# Patient Record
Sex: MALE | Race: WHITE | NOT HISPANIC OR LATINO
[De-identification: names, ages, dates, MRNs, and addresses within clinical notes are randomized per-mention and may not be internally consistent; named-entity substitution may affect disease eponyms.]

---

## 2017-03-13 ENCOUNTER — RX RENEWAL (OUTPATIENT)
Age: 57
End: 2017-03-13

## 2017-05-17 ENCOUNTER — APPOINTMENT (OUTPATIENT)
Dept: INTERNAL MEDICINE | Facility: CLINIC | Age: 57
End: 2017-05-17

## 2017-06-28 ENCOUNTER — RX RENEWAL (OUTPATIENT)
Age: 57
End: 2017-06-28

## 2017-07-06 ENCOUNTER — APPOINTMENT (OUTPATIENT)
Dept: INTERNAL MEDICINE | Facility: CLINIC | Age: 57
End: 2017-07-06

## 2017-07-06 VITALS
RESPIRATION RATE: 18 BRPM | SYSTOLIC BLOOD PRESSURE: 117 MMHG | HEIGHT: 72 IN | DIASTOLIC BLOOD PRESSURE: 76 MMHG | BODY MASS INDEX: 29.8 KG/M2 | WEIGHT: 220 LBS | TEMPERATURE: 98.5 F | HEART RATE: 62 BPM | OXYGEN SATURATION: 97 %

## 2017-07-06 DIAGNOSIS — Z87.898 PERSONAL HISTORY OF OTHER SPECIFIED CONDITIONS: ICD-10-CM

## 2017-07-07 LAB
25(OH)D3 SERPL-MCNC: 35.1 NG/ML
ALBUMIN SERPL ELPH-MCNC: 4.6 G/DL
ALP BLD-CCNC: 55 U/L
ALT SERPL-CCNC: 27 U/L
ANION GAP SERPL CALC-SCNC: 15 MMOL/L
APPEARANCE: CLEAR
AST SERPL-CCNC: 23 U/L
BASOPHILS # BLD AUTO: 0.03 K/UL
BASOPHILS NFR BLD AUTO: 0.6 %
BILIRUB SERPL-MCNC: 0.4 MG/DL
BILIRUBIN URINE: NEGATIVE
BLOOD URINE: NEGATIVE
BUN SERPL-MCNC: 14 MG/DL
CALCIUM SERPL-MCNC: 9.2 MG/DL
CHLORIDE SERPL-SCNC: 104 MMOL/L
CHOLEST SERPL-MCNC: 183 MG/DL
CHOLEST/HDLC SERPL: 2.5 RATIO
CO2 SERPL-SCNC: 26 MMOL/L
COLOR: YELLOW
CREAT SERPL-MCNC: 1.02 MG/DL
EOSINOPHIL # BLD AUTO: 0.11 K/UL
EOSINOPHIL NFR BLD AUTO: 2 %
GLUCOSE QUALITATIVE U: NORMAL MG/DL
GLUCOSE SERPL-MCNC: 104 MG/DL
HBA1C MFR BLD HPLC: 5.4 %
HCT VFR BLD CALC: 45.7 %
HDLC SERPL-MCNC: 72 MG/DL
HGB BLD-MCNC: 15.4 G/DL
IMM GRANULOCYTES NFR BLD AUTO: 0.2 %
KETONES URINE: NEGATIVE
LDLC SERPL CALC-MCNC: 97 MG/DL
LEUKOCYTE ESTERASE URINE: NEGATIVE
LYMPHOCYTES # BLD AUTO: 1.51 K/UL
LYMPHOCYTES NFR BLD AUTO: 27.8 %
MAN DIFF?: NORMAL
MCHC RBC-ENTMCNC: 30.4 PG
MCHC RBC-ENTMCNC: 33.7 GM/DL
MCV RBC AUTO: 90.3 FL
MONOCYTES # BLD AUTO: 0.55 K/UL
MONOCYTES NFR BLD AUTO: 10.1 %
NEUTROPHILS # BLD AUTO: 3.22 K/UL
NEUTROPHILS NFR BLD AUTO: 59.3 %
NITRITE URINE: NEGATIVE
PH URINE: 6.5
PLATELET # BLD AUTO: 212 K/UL
POTASSIUM SERPL-SCNC: 4.6 MMOL/L
PROT SERPL-MCNC: 7.4 G/DL
PROTEIN URINE: NEGATIVE MG/DL
PSA FREE FLD-MCNC: 22.5
PSA FREE SERPL-MCNC: 0.34 NG/ML
PSA SERPL-MCNC: 1.51 NG/ML
RBC # BLD: 5.06 M/UL
RBC # FLD: 13 %
SODIUM SERPL-SCNC: 145 MMOL/L
SPECIFIC GRAVITY URINE: 1.02
TRIGL SERPL-MCNC: 70 MG/DL
TSH SERPL-ACNC: 1.07 UIU/ML
UROBILINOGEN URINE: NORMAL MG/DL
WBC # FLD AUTO: 5.43 K/UL

## 2017-07-31 ENCOUNTER — RX RENEWAL (OUTPATIENT)
Age: 57
End: 2017-07-31

## 2017-08-31 ENCOUNTER — RX RENEWAL (OUTPATIENT)
Age: 57
End: 2017-08-31

## 2017-10-02 ENCOUNTER — RX RENEWAL (OUTPATIENT)
Age: 57
End: 2017-10-02

## 2017-10-02 DIAGNOSIS — R19.5 OTHER FECAL ABNORMALITIES: ICD-10-CM

## 2017-10-16 PROBLEM — R19.5 GUAIAC + STOOL: Status: ACTIVE | Noted: 2017-10-16

## 2017-10-30 ENCOUNTER — RX RENEWAL (OUTPATIENT)
Age: 57
End: 2017-10-30

## 2017-12-11 ENCOUNTER — RX RENEWAL (OUTPATIENT)
Age: 57
End: 2017-12-11

## 2018-01-08 ENCOUNTER — RX RENEWAL (OUTPATIENT)
Age: 58
End: 2018-01-08

## 2018-02-12 ENCOUNTER — RX RENEWAL (OUTPATIENT)
Age: 58
End: 2018-02-12

## 2018-03-08 ENCOUNTER — RX RENEWAL (OUTPATIENT)
Age: 58
End: 2018-03-08

## 2018-03-10 ENCOUNTER — EMERGENCY (EMERGENCY)
Facility: HOSPITAL | Age: 58
LOS: 1 days | Discharge: HOME | End: 2018-03-10
Attending: EMERGENCY MEDICINE
Payer: COMMERCIAL

## 2018-03-10 VITALS
OXYGEN SATURATION: 97 % | RESPIRATION RATE: 20 BRPM | HEIGHT: 68 IN | WEIGHT: 229.94 LBS | TEMPERATURE: 98 F | DIASTOLIC BLOOD PRESSURE: 80 MMHG | HEART RATE: 70 BPM | SYSTOLIC BLOOD PRESSURE: 137 MMHG

## 2018-03-10 PROCEDURE — 99282 EMERGENCY DEPT VISIT SF MDM: CPT | Mod: 25

## 2018-03-10 PROCEDURE — 12001 RPR S/N/AX/GEN/TRNK 2.5CM/<: CPT

## 2018-03-10 RX ORDER — ACETAMINOPHEN 500 MG
975 TABLET ORAL ONCE
Qty: 0 | Refills: 0 | Status: COMPLETED | OUTPATIENT
Start: 2018-03-10 | End: 2018-03-10

## 2018-03-10 RX ORDER — TETANUS TOXOID, REDUCED DIPHTHERIA TOXOID AND ACELLULAR PERTUSSIS VACCINE, ADSORBED 5; 2.5; 8; 8; 2.5 [IU]/.5ML; [IU]/.5ML; UG/.5ML; UG/.5ML; UG/.5ML
0.5 SUSPENSION INTRAMUSCULAR ONCE
Qty: 0 | Refills: 0 | Status: COMPLETED | OUTPATIENT
Start: 2018-03-10 | End: 2018-03-10

## 2018-03-10 NOTE — ED PROVIDER NOTE - OBJECTIVE STATEMENT
58 y/o M presents to ED c/o laceration 56 y/o M presents to ED c/o laceration to L thumb s/p trying to separate frozen food with a knife onset today. Currently has pain in the area. Pt denies active bleeding at site, numbness or loss of sensation to the thumb. Unsure of last tetanus shot. Did not self-medicate PTA. NKDA. No further complaints at this time.

## 2018-03-10 NOTE — ED PROVIDER NOTE - PHYSICAL EXAMINATION
VITALS: reviewed  GEN: NAD, A & O x 4  HEAD/EYES: NCAT, PERRL, EOMI, anicteric sclerae, no conjunctival pallor  ENT: mucus membranes moist, oropharynx WNL, trachea midline, no JVD  RESP: lungs CTA with equal breath sounds bilaterally, chest wall nontender and atraumatic  CV: heart with reg rhythm S1, S2, no murmur; distal pulses intact and symmetric bilaterally  ABDOMEN: normoactive bowel sounds, soft, nondistended, nontender, no palpable masses  : no CVAT  MSK: extremities atraumatic and nontender, no edema, no assymetry. the back is without midline or lateral tenderness, there is no spinal deformity or stepoff and the back is ranged painlessly. the neck has no midline tenderness, deformity, or stepoff, and is ranged painlessly.  SKIN: warm, dry, no rash, no bruising, no cyanosis. color appropriate for ethnicity  NEURO: alert, mentating appropriately, no facial asymmetry. gross sensation, motor, coordination are intact  PSYCH: Affect appropriate VITALS: reviewed  GEN: NAD, A & O x 4  HEAD/EYES: NCAT, PERRL, EOMI, anicteric sclerae, no conjunctival pallor  ENT: mucus membranes moist, oropharynx WNL, trachea midline, no JVD  RESP: lungs CTA with equal breath sounds bilaterally, chest wall nontender and atraumatic  CV: heart with reg rhythm S1, S2, no murmur; distal pulses intact and symmetric bilaterally  ABDOMEN: normoactive bowel sounds, soft, nondistended, nontender, no palpable masses  : no CVAT  MSK: extremities atraumatic and nontender, no edema, no assymetry. the back is without midline or lateral tenderness, there is no spinal deformity or stepoff and the back is ranged painlessly. the neck has no midline tenderness, deformity, or stepoff, and is ranged painlessly.  SKIN: warm, dry, no rash, no bruising, no cyanosis. color appropriate for ethnicity,   NEURO: alert, mentating appropriately, no facial asymmetry. gross sensation, motor, coordination are intact  PSYCH: Affect appropriate VITALS: reviewed  GEN: NAD, A & O x 4  HEAD/EYES: NCAT, PERRL, EOMI, anicteric sclerae, no conjunctival pallor  ENT: mucus membranes moist, oropharynx WNL, trachea midline, no JVD  RESP: lungs CTA with equal breath sounds bilaterally, chest wall nontender and atraumatic  CV: heart with reg rhythm S1, S2, no murmur; distal pulses intact and symmetric bilaterally  ABDOMEN: normoactive bowel sounds, soft, nondistended, nontender, no palpable masses  : no CVAT  MSK: extremities atraumatic and nontender, no edema, no assymetry. the back is without midline or lateral tenderness, there is no spinal deformity or stepoff and the back is ranged painlessly. the neck has no midline tenderness, deformity, or stepoff, and is ranged painlessly. full sensation and range of motion in finger with laceration. no bony tenderness or swelling of digits.   SKIN: warm, dry, no rash, no bruising, no cyanosis. color appropriate for ethnicity, 1 cm linear laceration across dorsal surface of distal phalanx of L thumb, does not involve nailbed or cuticle. Normal capillary refill. No other skin lesions.   NEURO: alert, mentating appropriately, no facial asymmetry. gross sensation, motor, coordination are intact  PSYCH: Affect appropriate

## 2018-03-10 NOTE — ED PROVIDER NOTE - MEDICAL DECISION MAKING DETAILS
Patient with small wound with low tension will repair with Dermabond after irrigation. Will also give Tetanus shot.

## 2018-03-10 NOTE — ED ADULT NURSE NOTE - OBJECTIVE STATEMENT
pt A&Ox4, c/o left thumb lac, pt stated he cut himself with a knife while cooking, lac about 1cm to left thumb bleeding controlled

## 2018-03-10 NOTE — ED PROVIDER NOTE - NS ED ROS FT
CONST: no fevers, no chills, no trauma  EYES: no pain, no visual disturbances  ENT: no sore throat, no epistaxis, no rhinorrhea, no hearing changes  CV: no chest pain, no palpitations, no orthopnea, no extremity pain or swelling  RESP: no shortness of breath, no cough, no sputum, no pleurisy, no wheezing  ABD: no abdominal pain, no nausea, no vomiting, no diarrhea, no black or bloody stool  : no dysuria, no hematuria, no frequency, no urgency  MSK: no back pain, no neck pain, no extremity pain  NEURO: no headache, no sensory disturbances, no focal weakness, no dizziness  HEME: no easy bleeding or bruising  SKIN: no diaphoresis, no rash CONST: no fevers, no chills, no trauma  EYES: no pain, no visual disturbances  ENT: no sore throat, no epistaxis, no rhinorrhea, no hearing changes  CV: no chest pain, no palpitations, no orthopnea, no extremity pain or swelling  RESP: no shortness of breath, no cough, no sputum, no pleurisy, no wheezing  ABD: no abdominal pain, no nausea, no vomiting, no diarrhea, no black or bloody stool  : no dysuria, no hematuria, no frequency, no urgency  MSK: no back pain, no neck pain, no extremity pain  NEURO: no headache, no sensory disturbances, no focal weakness, no dizziness  HEME: no easy bleeding or bruising  SKIN: no diaphoresis, no rash, laceration to L 1st digit, pain at site of laceration, no active bleeding MSK: no back pain, no neck pain, see HPI  NEURO: no sensory disturbances, no focal weakness, no dizziness  HEME: no easy bleeding or bruising  SKIN: laceration to L 1st digit, pain at site of laceration, no active bleeding

## 2018-03-10 NOTE — ED PROVIDER NOTE - CARE PLAN
Principal Discharge DX:	Thumb laceration, left, initial encounter  Assessment and plan of treatment:	You were seen and evaluated in the emergency department for a thumb laceration. Your wound was cleaned and closed with tissue adhesive. It will fall off on its own. You may shower normally after 24 hours. Avoid any vigorous movement with your thumb for the next 3-5 days. You may take up to 400mg of ibuprofen (Motrin, Advil) every 6 hours as needed for pain. Please take ibuprofen with food if possible to prevent stomach upset. You may also take up to 1000mg of acetaminophen (Tylenol) every 6 hours as needed for pain. It is ok to mix these medications with each other. Do not mix them with other pain medications such as naproxen (Alieve) or asprin unless specifically instructed by your doctor. Many prescription pain medications and cough medications contain acetaminophen. If you take these medications, please discuss with your provider or primary care doctor if you need to adjust the dose of acetaminophen you can take. Please follow up with your regular doctor for a wound check in 10-14 days. Please return for concerning new or worsening symptoms including redness, swelling, significant new pain numbness, inability to move your finger, discharge of pus, or other worsening or concerning new symptoms.

## 2018-03-11 PROCEDURE — 90471 IMMUNIZATION ADMIN: CPT

## 2018-03-11 PROCEDURE — 90715 TDAP VACCINE 7 YRS/> IM: CPT

## 2018-03-11 PROCEDURE — 99283 EMERGENCY DEPT VISIT LOW MDM: CPT | Mod: 25

## 2018-03-11 PROCEDURE — 12001 RPR S/N/AX/GEN/TRNK 2.5CM/<: CPT | Mod: FA

## 2018-03-11 RX ADMIN — Medication 975 MILLIGRAM(S): at 00:33

## 2018-03-11 RX ADMIN — TETANUS TOXOID, REDUCED DIPHTHERIA TOXOID AND ACELLULAR PERTUSSIS VACCINE, ADSORBED 0.5 MILLILITER(S): 5; 2.5; 8; 8; 2.5 SUSPENSION INTRAMUSCULAR at 00:33

## 2018-03-11 NOTE — ED PROCEDURE NOTE - PROCEDURE ADDITIONAL DETAILS
Good approximation of wound, pre-fabricated splint was placed on thumb to stabilize wound and repair.

## 2018-04-03 ENCOUNTER — RX RENEWAL (OUTPATIENT)
Age: 58
End: 2018-04-03

## 2018-05-15 ENCOUNTER — APPOINTMENT (OUTPATIENT)
Dept: INTERNAL MEDICINE | Facility: CLINIC | Age: 58
End: 2018-05-15

## 2018-05-17 ENCOUNTER — RX RENEWAL (OUTPATIENT)
Age: 58
End: 2018-05-17

## 2018-05-29 ENCOUNTER — RX RENEWAL (OUTPATIENT)
Age: 58
End: 2018-05-29

## 2018-06-28 ENCOUNTER — RX RENEWAL (OUTPATIENT)
Age: 58
End: 2018-06-28

## 2018-07-09 ENCOUNTER — RX RENEWAL (OUTPATIENT)
Age: 58
End: 2018-07-09

## 2018-07-28 ENCOUNTER — RX RENEWAL (OUTPATIENT)
Age: 58
End: 2018-07-28

## 2018-09-12 ENCOUNTER — RX RENEWAL (OUTPATIENT)
Age: 58
End: 2018-09-12

## 2018-10-03 ENCOUNTER — RX RENEWAL (OUTPATIENT)
Age: 58
End: 2018-10-03

## 2018-10-17 ENCOUNTER — RX RENEWAL (OUTPATIENT)
Age: 58
End: 2018-10-17

## 2018-11-15 ENCOUNTER — RX RENEWAL (OUTPATIENT)
Age: 58
End: 2018-11-15

## 2018-11-27 ENCOUNTER — RX RENEWAL (OUTPATIENT)
Age: 58
End: 2018-11-27

## 2018-12-12 PROBLEM — E78.00 PURE HYPERCHOLESTEROLEMIA, UNSPECIFIED: Chronic | Status: ACTIVE | Noted: 2018-03-10

## 2018-12-12 PROBLEM — I10 ESSENTIAL (PRIMARY) HYPERTENSION: Chronic | Status: ACTIVE | Noted: 2018-03-10

## 2018-12-20 ENCOUNTER — OUTPATIENT (OUTPATIENT)
Dept: OUTPATIENT SERVICES | Facility: HOSPITAL | Age: 58
LOS: 1 days | Discharge: HOME | End: 2018-12-20
Payer: COMMERCIAL

## 2018-12-20 VITALS
HEART RATE: 67 BPM | TEMPERATURE: 98 F | OXYGEN SATURATION: 100 % | DIASTOLIC BLOOD PRESSURE: 70 MMHG | HEIGHT: 72 IN | RESPIRATION RATE: 20 BRPM | SYSTOLIC BLOOD PRESSURE: 131 MMHG | WEIGHT: 246.04 LBS

## 2018-12-20 DIAGNOSIS — Z98.890 OTHER SPECIFIED POSTPROCEDURAL STATES: Chronic | ICD-10-CM

## 2018-12-20 DIAGNOSIS — Z98.52 VASECTOMY STATUS: Chronic | ICD-10-CM

## 2018-12-20 DIAGNOSIS — Z96.7 PRESENCE OF OTHER BONE AND TENDON IMPLANTS: Chronic | ICD-10-CM

## 2018-12-20 DIAGNOSIS — S83.241D OTHER TEAR OF MEDIAL MENISCUS, CURRENT INJURY, RIGHT KNEE, SUBSEQUENT ENCOUNTER: ICD-10-CM

## 2018-12-20 LAB
ALBUMIN SERPL ELPH-MCNC: 4.1 G/DL — SIGNIFICANT CHANGE UP (ref 3.3–5)
ALP SERPL-CCNC: 59 U/L — SIGNIFICANT CHANGE UP (ref 40–120)
ALT FLD-CCNC: 44 U/L — SIGNIFICANT CHANGE UP (ref 12–78)
ANION GAP SERPL CALC-SCNC: 6 MMOL/L — SIGNIFICANT CHANGE UP (ref 5–17)
APTT BLD: 33.1 SEC — SIGNIFICANT CHANGE UP (ref 27.5–36.3)
AST SERPL-CCNC: 24 U/L — SIGNIFICANT CHANGE UP (ref 15–37)
BASOPHILS # BLD AUTO: 0.06 K/UL — SIGNIFICANT CHANGE UP (ref 0–0.2)
BASOPHILS NFR BLD AUTO: 0.8 % — SIGNIFICANT CHANGE UP (ref 0–2)
BILIRUB DIRECT SERPL-MCNC: 0.2 MG/DL — SIGNIFICANT CHANGE UP (ref 0–0.2)
BILIRUB INDIRECT FLD-MCNC: 0.3 MG/DL — SIGNIFICANT CHANGE UP (ref 0.2–1)
BILIRUB SERPL-MCNC: 0.5 MG/DL — SIGNIFICANT CHANGE UP (ref 0.2–1.2)
BUN SERPL-MCNC: 16 MG/DL — SIGNIFICANT CHANGE UP (ref 7–23)
CALCIUM SERPL-MCNC: 9.4 MG/DL — SIGNIFICANT CHANGE UP (ref 8.5–10.1)
CHLORIDE SERPL-SCNC: 103 MMOL/L — SIGNIFICANT CHANGE UP (ref 96–108)
CO2 SERPL-SCNC: 27 MMOL/L — SIGNIFICANT CHANGE UP (ref 22–31)
CREAT SERPL-MCNC: 0.95 MG/DL — SIGNIFICANT CHANGE UP (ref 0.5–1.3)
EOSINOPHIL # BLD AUTO: 0.11 K/UL — SIGNIFICANT CHANGE UP (ref 0–0.5)
EOSINOPHIL NFR BLD AUTO: 1.4 % — SIGNIFICANT CHANGE UP (ref 0–6)
GLUCOSE SERPL-MCNC: 98 MG/DL — SIGNIFICANT CHANGE UP (ref 70–99)
HCT VFR BLD CALC: 47.2 % — SIGNIFICANT CHANGE UP (ref 39–50)
HGB BLD-MCNC: 16.1 G/DL — SIGNIFICANT CHANGE UP (ref 13–17)
IMM GRANULOCYTES NFR BLD AUTO: 0.3 % — SIGNIFICANT CHANGE UP (ref 0–1.5)
INR BLD: 1.01 RATIO — SIGNIFICANT CHANGE UP (ref 0.88–1.16)
LYMPHOCYTES # BLD AUTO: 1.83 K/UL — SIGNIFICANT CHANGE UP (ref 1–3.3)
LYMPHOCYTES # BLD AUTO: 24 % — SIGNIFICANT CHANGE UP (ref 13–44)
MCHC RBC-ENTMCNC: 30.4 PG — SIGNIFICANT CHANGE UP (ref 27–34)
MCHC RBC-ENTMCNC: 34.1 GM/DL — SIGNIFICANT CHANGE UP (ref 32–36)
MCV RBC AUTO: 89.1 FL — SIGNIFICANT CHANGE UP (ref 80–100)
MONOCYTES # BLD AUTO: 0.7 K/UL — SIGNIFICANT CHANGE UP (ref 0–0.9)
MONOCYTES NFR BLD AUTO: 9.2 % — SIGNIFICANT CHANGE UP (ref 2–14)
NEUTROPHILS # BLD AUTO: 4.89 K/UL — SIGNIFICANT CHANGE UP (ref 1.8–7.4)
NEUTROPHILS NFR BLD AUTO: 64.3 % — SIGNIFICANT CHANGE UP (ref 43–77)
NRBC # BLD: 0 /100 WBCS — SIGNIFICANT CHANGE UP (ref 0–0)
PLATELET # BLD AUTO: 269 K/UL — SIGNIFICANT CHANGE UP (ref 150–400)
POTASSIUM SERPL-MCNC: 4.3 MMOL/L — SIGNIFICANT CHANGE UP (ref 3.5–5.3)
POTASSIUM SERPL-SCNC: 4.3 MMOL/L — SIGNIFICANT CHANGE UP (ref 3.5–5.3)
PROT SERPL-MCNC: 8.4 GM/DL — HIGH (ref 6–8.3)
PROTHROM AB SERPL-ACNC: 11.2 SEC — SIGNIFICANT CHANGE UP (ref 10–12.9)
RBC # BLD: 5.3 M/UL — SIGNIFICANT CHANGE UP (ref 4.2–5.8)
RBC # FLD: 12.7 % — SIGNIFICANT CHANGE UP (ref 10.3–14.5)
SODIUM SERPL-SCNC: 136 MMOL/L — SIGNIFICANT CHANGE UP (ref 135–145)
WBC # BLD: 7.61 K/UL — SIGNIFICANT CHANGE UP (ref 3.8–10.5)
WBC # FLD AUTO: 7.61 K/UL — SIGNIFICANT CHANGE UP (ref 3.8–10.5)

## 2018-12-20 PROCEDURE — 93010 ELECTROCARDIOGRAM REPORT: CPT

## 2018-12-20 PROCEDURE — 71046 X-RAY EXAM CHEST 2 VIEWS: CPT | Mod: 26

## 2018-12-20 NOTE — H&P PST ADULT - PSH
H/O arthroscopy of left knee  2003  H/O arthroscopy of right knee  2006  H/O: vasectomy  1996  S/P colonoscopy  5 yrs ago  S/P ORIF (open reduction internal fixation) fracture  right ankle 1982

## 2018-12-20 NOTE — H&P PST ADULT - ASSESSMENT
57 y/o male with right knee meniscus tear. Scheduled for right knee arthroscopy.    Plan  1. Stop all NSAIDS, herbal supplements and vitamins for 7 days.  2. NPO at midnight.  3. Take the following medications ( citalopram ) with small sips of water on the morning of your procedure/surgery. Use inhalers morning of surgery.  4. Use EZ sponges as directed  5. Labs, EKG, CXR as per surgeon  6. PMD visit for optimization prior to surgery as per surgeon

## 2018-12-20 NOTE — H&P PST ADULT - FAMILY HISTORY
Father  Still living? Unknown  Family history of Alzheimer's disease, Age at diagnosis: Age Unknown     Mother  Still living? Unknown  Family history of colon cancer in mother, Age at diagnosis: Age Unknown     Sibling  Still living? Unknown  Family history of cancer of tongue, Age at diagnosis: Age Unknown

## 2018-12-20 NOTE — H&P PST ADULT - PMH
Alcoholism  last drink 2014  Anxiety    Asthma    Depression    High cholesterol    History of drug use  coccaine  last use 5-6 yrs ago  HTN (hypertension)    OA (osteoarthritis) of knee    Obesity    Psoriasis    Torn meniscus

## 2018-12-20 NOTE — H&P PST ADULT - HISTORY OF PRESENT ILLNESS
59 y/o male with right knee meniscus tear. Complain of right knee pain. Takes tramadol or Advil with mild pain relief. Scheduled for right knee arthroscopy.

## 2018-12-27 ENCOUNTER — TRANSCRIPTION ENCOUNTER (OUTPATIENT)
Age: 58
End: 2018-12-27

## 2018-12-27 ENCOUNTER — APPOINTMENT (OUTPATIENT)
Dept: INTERNAL MEDICINE | Facility: CLINIC | Age: 58
End: 2018-12-27
Payer: COMMERCIAL

## 2018-12-27 VITALS
WEIGHT: 245 LBS | TEMPERATURE: 97.8 F | OXYGEN SATURATION: 97 % | SYSTOLIC BLOOD PRESSURE: 122 MMHG | RESPIRATION RATE: 17 BRPM | DIASTOLIC BLOOD PRESSURE: 80 MMHG | HEIGHT: 72 IN | HEART RATE: 74 BPM | BODY MASS INDEX: 33.18 KG/M2

## 2018-12-27 DIAGNOSIS — N52.9 MALE ERECTILE DYSFUNCTION, UNSPECIFIED: ICD-10-CM

## 2018-12-27 DIAGNOSIS — Z01.818 ENCOUNTER FOR OTHER PREPROCEDURAL EXAMINATION: ICD-10-CM

## 2018-12-27 PROCEDURE — 99215 OFFICE O/P EST HI 40 MIN: CPT

## 2018-12-27 RX ORDER — TADALAFIL 10 MG/1
10 TABLET, FILM COATED ORAL
Qty: 7 | Refills: 0 | Status: ACTIVE | COMMUNITY
Start: 2018-12-27 | End: 1900-01-01

## 2018-12-31 ENCOUNTER — RESULT REVIEW (OUTPATIENT)
Age: 58
End: 2018-12-31

## 2018-12-31 ENCOUNTER — OUTPATIENT (OUTPATIENT)
Dept: OUTPATIENT SERVICES | Facility: HOSPITAL | Age: 58
LOS: 1 days | Discharge: HOME | End: 2018-12-31
Payer: COMMERCIAL

## 2018-12-31 VITALS
SYSTOLIC BLOOD PRESSURE: 108 MMHG | HEART RATE: 80 BPM | TEMPERATURE: 98 F | RESPIRATION RATE: 16 BRPM | WEIGHT: 246.04 LBS | HEIGHT: 72 IN | DIASTOLIC BLOOD PRESSURE: 75 MMHG | OXYGEN SATURATION: 97 %

## 2018-12-31 VITALS
HEART RATE: 85 BPM | OXYGEN SATURATION: 95 % | TEMPERATURE: 98 F | DIASTOLIC BLOOD PRESSURE: 73 MMHG | RESPIRATION RATE: 16 BRPM | SYSTOLIC BLOOD PRESSURE: 108 MMHG

## 2018-12-31 DIAGNOSIS — Z98.890 OTHER SPECIFIED POSTPROCEDURAL STATES: Chronic | ICD-10-CM

## 2018-12-31 DIAGNOSIS — Z98.52 VASECTOMY STATUS: Chronic | ICD-10-CM

## 2018-12-31 DIAGNOSIS — Z96.7 PRESENCE OF OTHER BONE AND TENDON IMPLANTS: Chronic | ICD-10-CM

## 2018-12-31 PROCEDURE — 88304 TISSUE EXAM BY PATHOLOGIST: CPT | Mod: 26

## 2018-12-31 RX ORDER — OXYCODONE HYDROCHLORIDE 5 MG/1
5 TABLET ORAL ONCE
Qty: 0 | Refills: 0 | Status: DISCONTINUED | OUTPATIENT
Start: 2018-12-31 | End: 2018-12-31

## 2018-12-31 RX ORDER — ACETAMINOPHEN 500 MG
1000 TABLET ORAL ONCE
Qty: 0 | Refills: 0 | Status: COMPLETED | OUTPATIENT
Start: 2018-12-31 | End: 2018-12-31

## 2018-12-31 RX ORDER — SODIUM CHLORIDE 9 MG/ML
1000 INJECTION, SOLUTION INTRAVENOUS
Qty: 0 | Refills: 0 | Status: DISCONTINUED | OUTPATIENT
Start: 2018-12-31 | End: 2018-12-31

## 2018-12-31 RX ORDER — ONDANSETRON 8 MG/1
4 TABLET, FILM COATED ORAL ONCE
Qty: 0 | Refills: 0 | Status: DISCONTINUED | OUTPATIENT
Start: 2018-12-31 | End: 2018-12-31

## 2018-12-31 RX ORDER — OXYCODONE AND ACETAMINOPHEN 5; 325 MG/1; MG/1
2 TABLET ORAL
Qty: 48 | Refills: 0
Start: 2018-12-31 | End: 2019-01-03

## 2018-12-31 RX ORDER — HYDROMORPHONE HYDROCHLORIDE 2 MG/ML
0.5 INJECTION INTRAMUSCULAR; INTRAVENOUS; SUBCUTANEOUS
Qty: 0 | Refills: 0 | Status: DISCONTINUED | OUTPATIENT
Start: 2018-12-31 | End: 2018-12-31

## 2018-12-31 RX ADMIN — OXYCODONE HYDROCHLORIDE 5 MILLIGRAM(S): 5 TABLET ORAL at 10:05

## 2018-12-31 RX ADMIN — HYDROMORPHONE HYDROCHLORIDE 0.5 MILLIGRAM(S): 2 INJECTION INTRAMUSCULAR; INTRAVENOUS; SUBCUTANEOUS at 10:05

## 2018-12-31 RX ADMIN — Medication 400 MILLIGRAM(S): at 10:05

## 2018-12-31 NOTE — ASU DISCHARGE PLAN (ADULT/PEDIATRIC). - NOTIFY
Numbness, color, or temperature change to extremity/Bleeding that does not stop/Swelling that continues/Pain not relieved by Medications/Fever greater than 101

## 2018-12-31 NOTE — ASU DISCHARGE PLAN (ADULT/PEDIATRIC). - SPECIAL INSTRUCTIONS
Follow up with Dr Lacey in 1-2 weeks. Call office for appointment. Take medications as prescribed. Weight bear as tolerated. Keep dressing clean, dry, and intact. Rest, ice, and elevate affected extremity. Can replace dressing in 5 days. Place adhesive bandage over incision sites.

## 2018-12-31 NOTE — ASU DISCHARGE PLAN (ADULT/PEDIATRIC). - MEDICATION SUMMARY - MEDICATIONS TO STOP TAKING
I will STOP taking the medications listed below when I get home from the hospital:    traMADol 50 mg oral tablet  -- 1 tab(s) by mouth 2 times a day

## 2018-12-31 NOTE — ASU DISCHARGE PLAN (ADULT/PEDIATRIC). - MEDICATION SUMMARY - MEDICATIONS TO TAKE
I will START or STAY ON the medications listed below when I get home from the hospital:    traMADol 50 mg oral tablet  -- 1 tab(s) by mouth 2 times a day  -- Indication: For Home med per PMD    Percocet 5/325 oral tablet  -- 1-2 tab(s) by mouth every 4 hours, As Needed  for pain MDD:7  -- Caution federal law prohibits the transfer of this drug to any person other  than the person for whom it was prescribed.  May cause drowsiness.  Alcohol may intensify this effect.  Use care when operating dangerous machinery.  This prescription cannot be refilled.  This product contains acetaminophen.  Do not use  with any other product containing acetaminophen to prevent possible liver damage.  Using more of this medication than prescribed may cause serious breathing problems.    -- Indication: For Pain    citalopram 40 mg oral tablet  -- 1 tab(s) by mouth once a day  -- Indication: For Home med per PMD    atorvastatin 10 mg oral tablet  -- 1 tab(s) by mouth once a day  -- Indication: For Home med per PMD    lisinopril-hydrochlorothiazide 20 mg-12.5 mg oral tablet  -- 1 tab(s) by mouth once a day  -- Indication: For Home med per PMD    Proventil HFA 90 mcg/inh inhalation aerosol  -- 2 puff(s) inhaled 4 times a day, As Needed  -- Indication: For Home med per PMD    Advair Diskus 500 mcg-50 mcg inhalation powder  -- 1 puff(s) inhaled 2 times a day  -- Indication: For Home med per PMD    Flonase 50 mcg/inh nasal spray  -- 1 spray(s) into nose once a day  -- Indication: For Home med per PMD

## 2018-12-31 NOTE — BRIEF OPERATIVE NOTE - PROCEDURE
<<-----Click on this checkbox to enter Procedure Meniscectomy of right knee  12/31/2018  Partial medial and lateral  Active  JAMIL

## 2019-01-02 LAB — SURGICAL PATHOLOGY FINAL REPORT - CH: SIGNIFICANT CHANGE UP

## 2019-01-03 DIAGNOSIS — F10.21 ALCOHOL DEPENDENCE, IN REMISSION: ICD-10-CM

## 2019-01-03 DIAGNOSIS — Z87.891 PERSONAL HISTORY OF NICOTINE DEPENDENCE: ICD-10-CM

## 2019-01-03 DIAGNOSIS — M17.11 UNILATERAL PRIMARY OSTEOARTHRITIS, RIGHT KNEE: ICD-10-CM

## 2019-01-03 DIAGNOSIS — J45.909 UNSPECIFIED ASTHMA, UNCOMPLICATED: ICD-10-CM

## 2019-01-03 DIAGNOSIS — Z80.0 FAMILY HISTORY OF MALIGNANT NEOPLASM OF DIGESTIVE ORGANS: ICD-10-CM

## 2019-01-03 DIAGNOSIS — F41.8 OTHER SPECIFIED ANXIETY DISORDERS: ICD-10-CM

## 2019-01-03 DIAGNOSIS — I10 ESSENTIAL (PRIMARY) HYPERTENSION: ICD-10-CM

## 2019-01-03 DIAGNOSIS — Z98.52 VASECTOMY STATUS: ICD-10-CM

## 2019-01-03 DIAGNOSIS — M94.261 CHONDROMALACIA, RIGHT KNEE: ICD-10-CM

## 2019-01-03 DIAGNOSIS — E78.00 PURE HYPERCHOLESTEROLEMIA, UNSPECIFIED: ICD-10-CM

## 2019-01-03 DIAGNOSIS — L40.9 PSORIASIS, UNSPECIFIED: ICD-10-CM

## 2019-01-03 DIAGNOSIS — M23.200 DERANGEMENT OF UNSPECIFIED LATERAL MENISCUS DUE TO OLD TEAR OR INJURY, RIGHT KNEE: ICD-10-CM

## 2019-01-03 DIAGNOSIS — F12.21 CANNABIS DEPENDENCE, IN REMISSION: ICD-10-CM

## 2019-01-03 DIAGNOSIS — E66.9 OBESITY, UNSPECIFIED: ICD-10-CM

## 2019-01-03 DIAGNOSIS — F14.21 COCAINE DEPENDENCE, IN REMISSION: ICD-10-CM

## 2019-01-03 DIAGNOSIS — M65.861 OTHER SYNOVITIS AND TENOSYNOVITIS, RIGHT LOWER LEG: ICD-10-CM

## 2019-01-03 DIAGNOSIS — M23.211 DERANGEMENT OF ANTERIOR HORN OF MEDIAL MENISCUS DUE TO OLD TEAR OR INJURY, RIGHT KNEE: ICD-10-CM

## 2019-03-02 ENCOUNTER — TRANSCRIPTION ENCOUNTER (OUTPATIENT)
Age: 59
End: 2019-03-02

## 2019-04-22 ENCOUNTER — RX RENEWAL (OUTPATIENT)
Age: 59
End: 2019-04-22

## 2019-05-23 ENCOUNTER — RX RENEWAL (OUTPATIENT)
Age: 59
End: 2019-05-23

## 2019-06-03 PROBLEM — N52.9 MALE ERECTILE DISORDER: Status: ACTIVE | Noted: 2018-12-27

## 2019-08-08 NOTE — H&P PST ADULT - TOBACCO FREE, LONGEST PERIOD, PROFILE
Alternatives Discussed Intro (Do Not Add Period): I discussed alternative treatments to Mohs surgery and specifically discussed the risks and benefits of quit social smoking in High school

## 2019-09-16 ENCOUNTER — RX RENEWAL (OUTPATIENT)
Age: 59
End: 2019-09-16

## 2019-12-30 ENCOUNTER — RX RENEWAL (OUTPATIENT)
Age: 59
End: 2019-12-30

## 2020-01-24 ENCOUNTER — RX RENEWAL (OUTPATIENT)
Age: 60
End: 2020-01-24

## 2020-05-06 ENCOUNTER — RX RENEWAL (OUTPATIENT)
Age: 60
End: 2020-05-06

## 2020-07-13 ENCOUNTER — RX RENEWAL (OUTPATIENT)
Age: 60
End: 2020-07-13

## 2020-09-09 ENCOUNTER — RX RENEWAL (OUTPATIENT)
Age: 60
End: 2020-09-09

## 2021-01-13 ENCOUNTER — RX RENEWAL (OUTPATIENT)
Age: 61
End: 2021-01-13

## 2021-02-14 ENCOUNTER — RX RENEWAL (OUTPATIENT)
Age: 61
End: 2021-02-14

## 2021-06-28 PROBLEM — F41.9 ANXIETY DISORDER, UNSPECIFIED: Chronic | Status: ACTIVE | Noted: 2018-12-20

## 2021-06-28 PROBLEM — E66.9 OBESITY, UNSPECIFIED: Chronic | Status: ACTIVE | Noted: 2018-12-20

## 2021-06-28 PROBLEM — F32.9 MAJOR DEPRESSIVE DISORDER, SINGLE EPISODE, UNSPECIFIED: Chronic | Status: ACTIVE | Noted: 2018-12-20

## 2021-06-28 PROBLEM — M17.10 UNILATERAL PRIMARY OSTEOARTHRITIS, UNSPECIFIED KNEE: Chronic | Status: ACTIVE | Noted: 2018-12-20

## 2021-06-28 PROBLEM — F10.20 ALCOHOL DEPENDENCE, UNCOMPLICATED: Chronic | Status: ACTIVE | Noted: 2018-12-20

## 2021-06-28 PROBLEM — J45.909 UNSPECIFIED ASTHMA, UNCOMPLICATED: Chronic | Status: ACTIVE | Noted: 2018-12-20

## 2021-06-28 PROBLEM — S83.209A UNSPECIFIED TEAR OF UNSPECIFIED MENISCUS, CURRENT INJURY, UNSPECIFIED KNEE, INITIAL ENCOUNTER: Chronic | Status: ACTIVE | Noted: 2018-12-20

## 2021-06-28 PROBLEM — L40.9 PSORIASIS, UNSPECIFIED: Chronic | Status: ACTIVE | Noted: 2018-12-20

## 2021-06-28 PROBLEM — Z87.898 PERSONAL HISTORY OF OTHER SPECIFIED CONDITIONS: Chronic | Status: ACTIVE | Noted: 2018-12-20

## 2021-07-26 ENCOUNTER — APPOINTMENT (OUTPATIENT)
Dept: INTERNAL MEDICINE | Facility: CLINIC | Age: 61
End: 2021-07-26

## 2021-10-01 ENCOUNTER — RX RENEWAL (OUTPATIENT)
Age: 61
End: 2021-10-01

## 2021-11-01 ENCOUNTER — RX RENEWAL (OUTPATIENT)
Age: 61
End: 2021-11-01

## 2021-11-17 ENCOUNTER — NON-APPOINTMENT (OUTPATIENT)
Age: 61
End: 2021-11-17

## 2021-11-17 ENCOUNTER — APPOINTMENT (OUTPATIENT)
Dept: INTERNAL MEDICINE | Facility: CLINIC | Age: 61
End: 2021-11-17
Payer: COMMERCIAL

## 2021-11-17 VITALS
DIASTOLIC BLOOD PRESSURE: 88 MMHG | HEIGHT: 72 IN | RESPIRATION RATE: 17 BRPM | SYSTOLIC BLOOD PRESSURE: 136 MMHG | OXYGEN SATURATION: 96 % | BODY MASS INDEX: 33.18 KG/M2 | TEMPERATURE: 97.8 F | HEART RATE: 84 BPM | WEIGHT: 245 LBS

## 2021-11-17 DIAGNOSIS — Z00.00 ENCOUNTER FOR GENERAL ADULT MEDICAL EXAMINATION W/OUT ABNORMAL FINDINGS: ICD-10-CM

## 2021-11-17 DIAGNOSIS — R91.8 OTHER NONSPECIFIC ABNORMAL FINDING OF LUNG FIELD: ICD-10-CM

## 2021-11-17 DIAGNOSIS — E78.5 HYPERLIPIDEMIA, UNSPECIFIED: ICD-10-CM

## 2021-11-17 DIAGNOSIS — F41.8 OTHER SPECIFIED ANXIETY DISORDERS: ICD-10-CM

## 2021-11-17 DIAGNOSIS — I10 ESSENTIAL (PRIMARY) HYPERTENSION: ICD-10-CM

## 2021-11-17 PROCEDURE — 99396 PREV VISIT EST AGE 40-64: CPT | Mod: 25

## 2021-11-17 PROCEDURE — 93000 ELECTROCARDIOGRAM COMPLETE: CPT

## 2021-11-18 LAB
ALBUMIN SERPL ELPH-MCNC: 4.3 G/DL
ALP BLD-CCNC: 70 U/L
ALT SERPL-CCNC: 66 U/L
ANION GAP SERPL CALC-SCNC: 17 MMOL/L
APPEARANCE: CLEAR
AST SERPL-CCNC: 53 U/L
BASOPHILS # BLD AUTO: 0.04 K/UL
BASOPHILS NFR BLD AUTO: 0.7 %
BILIRUB SERPL-MCNC: 0.6 MG/DL
BILIRUBIN URINE: NEGATIVE
BLOOD URINE: NEGATIVE
BUN SERPL-MCNC: 12 MG/DL
CALCIUM SERPL-MCNC: 9.4 MG/DL
CHLORIDE SERPL-SCNC: 101 MMOL/L
CHOLEST SERPL-MCNC: 181 MG/DL
CO2 SERPL-SCNC: 24 MMOL/L
COLOR: YELLOW
CREAT SERPL-MCNC: 0.86 MG/DL
EOSINOPHIL # BLD AUTO: 0.11 K/UL
EOSINOPHIL NFR BLD AUTO: 1.8 %
ESTIMATED AVERAGE GLUCOSE: 105 MG/DL
FOLATE SERPL-MCNC: >20 NG/ML
GLUCOSE QUALITATIVE U: NEGATIVE
GLUCOSE SERPL-MCNC: 89 MG/DL
HBA1C MFR BLD HPLC: 5.3 %
HCT VFR BLD CALC: 46.4 %
HDLC SERPL-MCNC: 65 MG/DL
HGB BLD-MCNC: 15.7 G/DL
IMM GRANULOCYTES NFR BLD AUTO: 0.3 %
IRON SATN MFR SERPL: 43 %
IRON SERPL-MCNC: 135 UG/DL
KETONES URINE: NEGATIVE
LDLC SERPL CALC-MCNC: 95 MG/DL
LEUKOCYTE ESTERASE URINE: NEGATIVE
LYMPHOCYTES # BLD AUTO: 1.37 K/UL
LYMPHOCYTES NFR BLD AUTO: 22.9 %
MAN DIFF?: NORMAL
MCHC RBC-ENTMCNC: 33.8 GM/DL
MCHC RBC-ENTMCNC: 33.8 PG
MCV RBC AUTO: 100 FL
MONOCYTES # BLD AUTO: 0.68 K/UL
MONOCYTES NFR BLD AUTO: 11.4 %
NEUTROPHILS # BLD AUTO: 3.75 K/UL
NEUTROPHILS NFR BLD AUTO: 62.9 %
NITRITE URINE: NEGATIVE
NONHDLC SERPL-MCNC: 117 MG/DL
PH URINE: 7.5
PLATELET # BLD AUTO: 235 K/UL
POTASSIUM SERPL-SCNC: 4.4 MMOL/L
PROT SERPL-MCNC: 7.2 G/DL
PROTEIN URINE: NEGATIVE
PSA FREE FLD-MCNC: 17 %
PSA FREE SERPL-MCNC: 0.62 NG/ML
PSA SERPL-MCNC: 3.66 NG/ML
RBC # BLD: 4.64 M/UL
RBC # FLD: 12.6 %
SODIUM SERPL-SCNC: 142 MMOL/L
SPECIFIC GRAVITY URINE: 1.02
TIBC SERPL-MCNC: 317 UG/DL
TRIGL SERPL-MCNC: 109 MG/DL
TSH SERPL-ACNC: 0.83 UIU/ML
UIBC SERPL-MCNC: 181 UG/DL
UROBILINOGEN URINE: NORMAL
VIT B12 SERPL-MCNC: 578 PG/ML
WBC # FLD AUTO: 5.97 K/UL

## 2023-01-09 ENCOUNTER — RX RENEWAL (OUTPATIENT)
Age: 63
End: 2023-01-09

## 2023-05-01 ENCOUNTER — APPOINTMENT (OUTPATIENT)
Dept: SURGICAL ONCOLOGY | Facility: CLINIC | Age: 63
End: 2023-05-01
Payer: COMMERCIAL

## 2023-05-01 VITALS
WEIGHT: 235 LBS | HEART RATE: 84 BPM | DIASTOLIC BLOOD PRESSURE: 94 MMHG | HEIGHT: 72 IN | BODY MASS INDEX: 31.83 KG/M2 | OXYGEN SATURATION: 95 % | SYSTOLIC BLOOD PRESSURE: 150 MMHG

## 2023-05-01 PROCEDURE — 99204 OFFICE O/P NEW MOD 45 MIN: CPT

## 2023-05-01 NOTE — END OF VISIT
[FreeTextEntry3] : I have seen and examined this patient with our NP Liset Ryan. The note contains my exam findings, impression and plan for this patient.\par

## 2023-05-01 NOTE — PHYSICAL EXAM
[Normal] : oriented to person, place and time, with appropriate affect [de-identified] : left forearm melanoma

## 2023-05-01 NOTE — ASSESSMENT
[FreeTextEntry1] : MALIGNANT MELANOMA \par AJCC STAGE IIIB\par T2 N2c M0\par \par This is an otherwise healthy 61 y/o with a stage IIIb melanoma at presentation based on the pathology results. I will have the path reviewed once again  but it appears that he has an in situ lesion adjacent to a 1.8 mm thick primary melanoma and a satellite nodule adjacent to the primary. On exam his axillary nodes are negative an the PET scan is also negative. I don't see the value in performing a sentinel node since he will need adjuvant immunotherapy based on the current stage. We will follow his nodes with serial exams and ultrasound in the future but I have recommended a wide local excision of the primary site on the left forearm with skin graft closure.  I have spoken to  his oncologist who is in agreement with this plan.

## 2023-05-01 NOTE — HISTORY OF PRESENT ILLNESS
[de-identified] : Mr. Tipton presents to the office for initial consultation for melanoma of  left arm\par \par 63 yo Male with history of atypical nevi on left arm. He noted it few months ago initially appeared as a pimple and over time it enlarged and looked more like a cyst. He was seen by dermatologist. He had shave biopsy done that showed malignant melanoma in the dermis and subcutis and malignant melanoma in situ. The majority if the lesion consists of  nodule of malignant melanoma in the dermis and subcutis measuring 17.0 mm in largest dimension.  The differential diagnosis includes two foci of metastatic melanoma with concurrent malignant melanoma in situ and a primary melanoma measuring approximately 1.80 mm in thickness with a 17 mm (1.7 cm) satellite nodule. The lesion extends to the base and peripheral margins. Based on this information the patent was staged with a CT/PET scan on 2023 that showed no abnormal foci of hypermetabolism to suggest metastatic activity. He is being referred for definitive excision of the site of the primary and satellite lesions. . \par  \par PMH:  HLD, HTN, recover ETOH abuse s/p rehab now abstinent from alcohol, depression/anxiety, asthma ([facility] related), stable lung nodules f/u at [facility] (reports stable nodules on follow-up CT scans). He reports having ankle surgery in , right knee scope, reports having chest tightness had cardiac angiogram done recently no stents placed.  \par Family history of father - early Alzheimer's disease diagnosed at age 50; mother colon cancer diagnosed in her 80s, brother  of liver disease- cirrhosis, another brother oral cancer, stage IV stomach cancer  few months ago. He reports having 3 children one son passed away 2 years ago at the age of 27. He is not vaccinated against COVID 19\par \par

## 2023-05-08 ENCOUNTER — OUTPATIENT (OUTPATIENT)
Dept: OUTPATIENT SERVICES | Facility: HOSPITAL | Age: 63
LOS: 1 days | End: 2023-05-08
Payer: COMMERCIAL

## 2023-05-08 ENCOUNTER — RESULT REVIEW (OUTPATIENT)
Age: 63
End: 2023-05-08

## 2023-05-08 VITALS
OXYGEN SATURATION: 98 % | DIASTOLIC BLOOD PRESSURE: 82 MMHG | SYSTOLIC BLOOD PRESSURE: 126 MMHG | WEIGHT: 240.97 LBS | TEMPERATURE: 97 F | RESPIRATION RATE: 16 BRPM | HEART RATE: 87 BPM | HEIGHT: 72 IN

## 2023-05-08 DIAGNOSIS — Z98.890 OTHER SPECIFIED POSTPROCEDURAL STATES: Chronic | ICD-10-CM

## 2023-05-08 DIAGNOSIS — C43.62 MALIGNANT MELANOMA OF LEFT UPPER LIMB, INCLUDING SHOULDER: ICD-10-CM

## 2023-05-08 DIAGNOSIS — Z01.818 ENCOUNTER FOR OTHER PREPROCEDURAL EXAMINATION: ICD-10-CM

## 2023-05-08 DIAGNOSIS — Z96.7 PRESENCE OF OTHER BONE AND TENDON IMPLANTS: Chronic | ICD-10-CM

## 2023-05-08 DIAGNOSIS — Z98.52 VASECTOMY STATUS: Chronic | ICD-10-CM

## 2023-05-08 LAB
ABO RH CONFIRMATION: SIGNIFICANT CHANGE UP
ANION GAP SERPL CALC-SCNC: 5 MMOL/L — SIGNIFICANT CHANGE UP (ref 5–17)
APPEARANCE UR: CLEAR — SIGNIFICANT CHANGE UP
APTT BLD: 32.4 SEC — SIGNIFICANT CHANGE UP (ref 27.5–35.5)
BASOPHILS # BLD AUTO: 0.04 K/UL — SIGNIFICANT CHANGE UP (ref 0–0.2)
BASOPHILS NFR BLD AUTO: 0.7 % — SIGNIFICANT CHANGE UP (ref 0–2)
BILIRUB UR-MCNC: NEGATIVE — SIGNIFICANT CHANGE UP
BLD GP AB SCN SERPL QL: SIGNIFICANT CHANGE UP
BUN SERPL-MCNC: 12 MG/DL — SIGNIFICANT CHANGE UP (ref 7–23)
CALCIUM SERPL-MCNC: 8.9 MG/DL — SIGNIFICANT CHANGE UP (ref 8.5–10.1)
CHLORIDE SERPL-SCNC: 108 MMOL/L — SIGNIFICANT CHANGE UP (ref 96–108)
CO2 SERPL-SCNC: 27 MMOL/L — SIGNIFICANT CHANGE UP (ref 22–31)
COLOR SPEC: YELLOW — SIGNIFICANT CHANGE UP
CREAT SERPL-MCNC: 0.98 MG/DL — SIGNIFICANT CHANGE UP (ref 0.5–1.3)
DIFF PNL FLD: ABNORMAL
EGFR: 88 ML/MIN/1.73M2 — SIGNIFICANT CHANGE UP
EOSINOPHIL # BLD AUTO: 0.06 K/UL — SIGNIFICANT CHANGE UP (ref 0–0.5)
EOSINOPHIL NFR BLD AUTO: 1.1 % — SIGNIFICANT CHANGE UP (ref 0–6)
EPI CELLS # UR: SIGNIFICANT CHANGE UP
GLUCOSE SERPL-MCNC: 106 MG/DL — HIGH (ref 70–99)
GLUCOSE UR QL: NEGATIVE — SIGNIFICANT CHANGE UP
HCT VFR BLD CALC: 45.8 % — SIGNIFICANT CHANGE UP (ref 39–50)
HGB BLD-MCNC: 16.3 G/DL — SIGNIFICANT CHANGE UP (ref 13–17)
IMM GRANULOCYTES NFR BLD AUTO: 0.4 % — SIGNIFICANT CHANGE UP (ref 0–0.9)
INR BLD: 1.04 RATIO — SIGNIFICANT CHANGE UP (ref 0.88–1.16)
KETONES UR-MCNC: NEGATIVE — SIGNIFICANT CHANGE UP
LEUKOCYTE ESTERASE UR-ACNC: NEGATIVE — SIGNIFICANT CHANGE UP
LYMPHOCYTES # BLD AUTO: 1.38 K/UL — SIGNIFICANT CHANGE UP (ref 1–3.3)
LYMPHOCYTES # BLD AUTO: 24.9 % — SIGNIFICANT CHANGE UP (ref 13–44)
MCHC RBC-ENTMCNC: 32.9 PG — SIGNIFICANT CHANGE UP (ref 27–34)
MCHC RBC-ENTMCNC: 35.6 GM/DL — SIGNIFICANT CHANGE UP (ref 32–36)
MCV RBC AUTO: 92.5 FL — SIGNIFICANT CHANGE UP (ref 80–100)
MONOCYTES # BLD AUTO: 0.71 K/UL — SIGNIFICANT CHANGE UP (ref 0–0.9)
MONOCYTES NFR BLD AUTO: 12.8 % — SIGNIFICANT CHANGE UP (ref 2–14)
NEUTROPHILS # BLD AUTO: 3.34 K/UL — SIGNIFICANT CHANGE UP (ref 1.8–7.4)
NEUTROPHILS NFR BLD AUTO: 60.1 % — SIGNIFICANT CHANGE UP (ref 43–77)
NITRITE UR-MCNC: NEGATIVE — SIGNIFICANT CHANGE UP
PH UR: 6.5 — SIGNIFICANT CHANGE UP (ref 5–8)
PLATELET # BLD AUTO: 191 K/UL — SIGNIFICANT CHANGE UP (ref 150–400)
POTASSIUM SERPL-MCNC: 4 MMOL/L — SIGNIFICANT CHANGE UP (ref 3.5–5.3)
POTASSIUM SERPL-SCNC: 4 MMOL/L — SIGNIFICANT CHANGE UP (ref 3.5–5.3)
PROT UR-MCNC: NEGATIVE — SIGNIFICANT CHANGE UP
PROTHROM AB SERPL-ACNC: 12.1 SEC — SIGNIFICANT CHANGE UP (ref 10.5–13.4)
RBC # BLD: 4.95 M/UL — SIGNIFICANT CHANGE UP (ref 4.2–5.8)
RBC # FLD: 13.2 % — SIGNIFICANT CHANGE UP (ref 10.3–14.5)
RBC CASTS # UR COMP ASSIST: SIGNIFICANT CHANGE UP /HPF (ref 0–4)
SODIUM SERPL-SCNC: 140 MMOL/L — SIGNIFICANT CHANGE UP (ref 135–145)
SP GR SPEC: 1.01 — SIGNIFICANT CHANGE UP (ref 1.01–1.02)
UROBILINOGEN FLD QL: NEGATIVE — SIGNIFICANT CHANGE UP
WBC # BLD: 5.55 K/UL — SIGNIFICANT CHANGE UP (ref 3.8–10.5)
WBC # FLD AUTO: 5.55 K/UL — SIGNIFICANT CHANGE UP (ref 3.8–10.5)
WBC UR QL: NEGATIVE /HPF — SIGNIFICANT CHANGE UP (ref 0–5)

## 2023-05-08 PROCEDURE — 71046 X-RAY EXAM CHEST 2 VIEWS: CPT | Mod: 26

## 2023-05-08 PROCEDURE — 86850 RBC ANTIBODY SCREEN: CPT

## 2023-05-08 PROCEDURE — 99214 OFFICE O/P EST MOD 30 MIN: CPT | Mod: 25

## 2023-05-08 PROCEDURE — 93010 ELECTROCARDIOGRAM REPORT: CPT

## 2023-05-08 PROCEDURE — 85610 PROTHROMBIN TIME: CPT

## 2023-05-08 PROCEDURE — 86900 BLOOD TYPING SEROLOGIC ABO: CPT

## 2023-05-08 PROCEDURE — 85025 COMPLETE CBC W/AUTO DIFF WBC: CPT

## 2023-05-08 PROCEDURE — 80048 BASIC METABOLIC PNL TOTAL CA: CPT

## 2023-05-08 PROCEDURE — 86901 BLOOD TYPING SEROLOGIC RH(D): CPT

## 2023-05-08 PROCEDURE — 71046 X-RAY EXAM CHEST 2 VIEWS: CPT

## 2023-05-08 PROCEDURE — 93005 ELECTROCARDIOGRAM TRACING: CPT

## 2023-05-08 PROCEDURE — 85730 THROMBOPLASTIN TIME PARTIAL: CPT

## 2023-05-08 PROCEDURE — 81001 URINALYSIS AUTO W/SCOPE: CPT

## 2023-05-08 PROCEDURE — 36415 COLL VENOUS BLD VENIPUNCTURE: CPT

## 2023-05-08 RX ORDER — TRAMADOL HYDROCHLORIDE 50 MG/1
1 TABLET ORAL
Qty: 0 | Refills: 0 | DISCHARGE

## 2023-05-08 RX ORDER — LISINOPRIL/HYDROCHLOROTHIAZIDE 10-12.5 MG
1 TABLET ORAL
Qty: 0 | Refills: 0 | DISCHARGE

## 2023-05-08 NOTE — H&P PST ADULT - ASSESSMENT
Plan:  1. PST instructions given ; NPO status/ instructions to be given by ASU   2. Pt instructed to take following meds on day of surgery:   3. Pt instructed to take routine evening medications unless indicated   4. Stop NSAIDS ( Aspirin, Aleve, Motrin, Mobic, Diclofenac), herbal supplements, MVI, Vitamins, fish oil 7 days prior to surgery unless directed by surgeon or cardiologist;   5. Medical Optimization with Dr   6. EZ wash instructions given.  7. Labs, EKG, CXR as per surgeon request .   Patient is a 62 year old male who presents to PST from home in no acute distress with chief complaint of melanoma to left arm. Patient noticed growth to left forearm a few months ago. He noticed growth to area which prompted him to be evaluated by Dermatology. Biopsy performed which came back as malignant melanoma in situ. Denies pain or discomfort to area. He is scheduled to have a wide excision of left arm melanoma and split thickness skin graft.     Plan:  1. PST instructions given ; NPO status/ instructions to be given by ASU   2. Pt instructed to take following meds on day of surgery: lisinopril/hydrochlorthiazide, atorvastatin, celexa   3. Pt instructed to take routine evening medications unless indicated   4. Stop NSAIDS ( Aspirin, Aleve, Motrin, Mobic, Diclofenac), herbal supplements, MVI, Vitamins, fish oil 7 days prior to surgery unless directed by surgeon or cardiologist;   5. Medical/Cardiac optimization at [facility] [place]  6. EZ wash instructions given.  7. Labs, EKG, CXR as per surgeon request .

## 2023-05-08 NOTE — H&P PST ADULT - NSICDXFAMILYHX_GEN_ALL_CORE_FT
FAMILY HISTORY:  Father  Still living? Unknown  Family history of Alzheimer's disease, Age at diagnosis: Age Unknown    Mother  Still living? Unknown  Family history of colon cancer in mother, Age at diagnosis: Age Unknown    Sibling  Still living? Unknown  Family history of cancer of tongue, Age at diagnosis: Age Unknown

## 2023-05-08 NOTE — H&P PST ADULT - CARDIOVASCULAR COMMENTS
Patient had a cardiac cath 3/2023 to r/o blockage - negative cath - no stents placed. Patient to obtain clearance.

## 2023-05-08 NOTE — H&P PST ADULT - NSICDXPASTSURGICALHX_GEN_ALL_CORE_FT
PAST SURGICAL HISTORY:  H/O arthroscopy of left knee 2003    H/O arthroscopy of right knee 2006    H/O: vasectomy 1996    S/P cardiac cath     S/P colonoscopy 5 yrs ago    S/P ORIF (open reduction internal fixation) fracture right ankle 1982

## 2023-05-08 NOTE — H&P PST ADULT - SKIN COMMENTS
left forearm lesion left forearm lesion with satellite lesion - scar noted where biopsy was performed

## 2023-05-08 NOTE — H&P PST ADULT - NSICDXPASTMEDICALHX_GEN_ALL_CORE_FT
PAST MEDICAL HISTORY:  Alcoholism last drink 2014    Anxiety     Arthritis     Asthma     Depression     High cholesterol     History of drug use coccaine  last use 5-6 yrs ago    HTN (hypertension)     OA (osteoarthritis) of knee     Obesity     Psoriasis     Torn meniscus

## 2023-05-08 NOTE — H&P PST ADULT - HISTORY OF PRESENT ILLNESS
Patient is a 62 year old male who presents to PST from home in no acute distress with chief complaint of melanoma to left arm.  Patient is a 62 year old male who presents to [facility] from home in no acute distress with chief complaint of melanoma to left arm. Patient noticed growth to left forearm a few months ago. He noticed growth to area which prompted him to be evaluated by Dermatology. Biopsy performed which came back as malignant melanoma in situ. Denies pain or discomfort to area. He is scheduled to have a wide excision of left arm melanoma and split thickness skin graft.

## 2023-05-08 NOTE — H&P PST ADULT - NSICDXPROCEDURE_GEN_ALL_CORE_FT
PROCEDURES:  Wide excision of melanoma with split thickness skin graft (STSG) of extremity 08-May-2023 13:00:01  Lizeth Rutledge

## 2023-05-09 DIAGNOSIS — C43.62 MALIGNANT MELANOMA OF LEFT UPPER LIMB, INCLUDING SHOULDER: ICD-10-CM

## 2023-05-09 DIAGNOSIS — Z01.818 ENCOUNTER FOR OTHER PREPROCEDURAL EXAMINATION: ICD-10-CM

## 2023-05-10 ENCOUNTER — RESULT REVIEW (OUTPATIENT)
Age: 63
End: 2023-05-10

## 2023-05-10 ENCOUNTER — OUTPATIENT (OUTPATIENT)
Dept: OUTPATIENT SERVICES | Facility: HOSPITAL | Age: 63
LOS: 1 days | End: 2023-05-10
Payer: COMMERCIAL

## 2023-05-10 DIAGNOSIS — Z96.7 PRESENCE OF OTHER BONE AND TENDON IMPLANTS: Chronic | ICD-10-CM

## 2023-05-10 DIAGNOSIS — C43.62 MALIGNANT MELANOMA OF LEFT UPPER LIMB, INCLUDING SHOULDER: ICD-10-CM

## 2023-05-10 DIAGNOSIS — Z98.890 OTHER SPECIFIED POSTPROCEDURAL STATES: Chronic | ICD-10-CM

## 2023-05-10 DIAGNOSIS — Z98.52 VASECTOMY STATUS: Chronic | ICD-10-CM

## 2023-05-10 PROBLEM — M19.90 UNSPECIFIED OSTEOARTHRITIS, UNSPECIFIED SITE: Chronic | Status: ACTIVE | Noted: 2023-05-08

## 2023-05-10 PROCEDURE — 88321 CONSLTJ&REPRT SLD PREP ELSWR: CPT

## 2023-05-11 DIAGNOSIS — C43.62 MALIGNANT MELANOMA OF LEFT UPPER LIMB, INCLUDING SHOULDER: ICD-10-CM

## 2023-05-16 ENCOUNTER — RESULT REVIEW (OUTPATIENT)
Age: 63
End: 2023-05-16

## 2023-05-16 ENCOUNTER — TRANSCRIPTION ENCOUNTER (OUTPATIENT)
Age: 63
End: 2023-05-16

## 2023-05-16 ENCOUNTER — APPOINTMENT (OUTPATIENT)
Dept: SURGICAL ONCOLOGY | Facility: HOSPITAL | Age: 63
End: 2023-05-16

## 2023-05-16 ENCOUNTER — OUTPATIENT (OUTPATIENT)
Dept: INPATIENT UNIT | Facility: HOSPITAL | Age: 63
LOS: 1 days | Discharge: HOME | End: 2023-05-16
Payer: COMMERCIAL

## 2023-05-16 VITALS
RESPIRATION RATE: 16 BRPM | HEART RATE: 72 BPM | OXYGEN SATURATION: 95 % | TEMPERATURE: 98 F | DIASTOLIC BLOOD PRESSURE: 83 MMHG | SYSTOLIC BLOOD PRESSURE: 126 MMHG

## 2023-05-16 VITALS
OXYGEN SATURATION: 96 % | HEIGHT: 72 IN | DIASTOLIC BLOOD PRESSURE: 89 MMHG | HEART RATE: 80 BPM | RESPIRATION RATE: 16 BRPM | SYSTOLIC BLOOD PRESSURE: 140 MMHG | TEMPERATURE: 98 F | WEIGHT: 240.97 LBS

## 2023-05-16 DIAGNOSIS — Z98.890 OTHER SPECIFIED POSTPROCEDURAL STATES: Chronic | ICD-10-CM

## 2023-05-16 DIAGNOSIS — Z98.52 VASECTOMY STATUS: Chronic | ICD-10-CM

## 2023-05-16 DIAGNOSIS — Z96.7 PRESENCE OF OTHER BONE AND TENDON IMPLANTS: Chronic | ICD-10-CM

## 2023-05-16 DIAGNOSIS — C43.62 MALIGNANT MELANOMA OF LEFT UPPER LIMB, INCLUDING SHOULDER: ICD-10-CM

## 2023-05-16 PROCEDURE — 88341 IMHCHEM/IMCYTCHM EA ADD ANTB: CPT

## 2023-05-16 PROCEDURE — 88341 IMHCHEM/IMCYTCHM EA ADD ANTB: CPT | Mod: 26

## 2023-05-16 PROCEDURE — 88305 TISSUE EXAM BY PATHOLOGIST: CPT | Mod: 26

## 2023-05-16 PROCEDURE — 88342 IMHCHEM/IMCYTCHM 1ST ANTB: CPT | Mod: 26

## 2023-05-16 PROCEDURE — 11606 EXC TR-EXT MAL+MARG >4 CM: CPT

## 2023-05-16 PROCEDURE — 88342 IMHCHEM/IMCYTCHM 1ST ANTB: CPT

## 2023-05-16 PROCEDURE — 88305 TISSUE EXAM BY PATHOLOGIST: CPT

## 2023-05-16 PROCEDURE — 15273 SKIN SUB GRFT T/ARM/LG CHILD: CPT

## 2023-05-16 RX ORDER — SODIUM CHLORIDE 9 MG/ML
1000 INJECTION, SOLUTION INTRAVENOUS
Refills: 0 | Status: DISCONTINUED | OUTPATIENT
Start: 2023-05-16 | End: 2023-05-16

## 2023-05-16 RX ORDER — FLUTICASONE PROPIONATE 50 MCG
1 SPRAY, SUSPENSION NASAL
Qty: 0 | Refills: 0 | DISCHARGE

## 2023-05-16 RX ORDER — ONDANSETRON 8 MG/1
4 TABLET, FILM COATED ORAL ONCE
Refills: 0 | Status: DISCONTINUED | OUTPATIENT
Start: 2023-05-16 | End: 2023-05-16

## 2023-05-16 RX ORDER — FLUTICASONE PROPIONATE AND SALMETEROL 50; 250 UG/1; UG/1
1 POWDER ORAL; RESPIRATORY (INHALATION)
Qty: 0 | Refills: 0 | DISCHARGE

## 2023-05-16 RX ORDER — LISINOPRIL 2.5 MG/1
1 TABLET ORAL
Refills: 0 | DISCHARGE

## 2023-05-16 RX ORDER — ACETAMINOPHEN 500 MG
1000 TABLET ORAL ONCE
Refills: 0 | Status: DISCONTINUED | OUTPATIENT
Start: 2023-05-16 | End: 2023-05-16

## 2023-05-16 RX ORDER — CITALOPRAM 10 MG/1
1 TABLET, FILM COATED ORAL
Qty: 0 | Refills: 0 | DISCHARGE

## 2023-05-16 RX ORDER — ALBUTEROL 90 UG/1
2 AEROSOL, METERED ORAL
Qty: 0 | Refills: 0 | DISCHARGE

## 2023-05-16 RX ORDER — HYDROCHLOROTHIAZIDE 25 MG
1 TABLET ORAL
Refills: 0 | DISCHARGE

## 2023-05-16 RX ORDER — OXYCODONE HYDROCHLORIDE 5 MG/1
5 TABLET ORAL ONCE
Refills: 0 | Status: DISCONTINUED | OUTPATIENT
Start: 2023-05-16 | End: 2023-05-16

## 2023-05-16 RX ORDER — ATORVASTATIN CALCIUM 80 MG/1
1 TABLET, FILM COATED ORAL
Qty: 0 | Refills: 0 | DISCHARGE

## 2023-05-16 RX ORDER — FENTANYL CITRATE 50 UG/ML
25 INJECTION INTRAVENOUS
Refills: 0 | Status: DISCONTINUED | OUTPATIENT
Start: 2023-05-16 | End: 2023-05-16

## 2023-05-16 NOTE — ASU DISCHARGE PLAN (ADULT/PEDIATRIC) - CARE PROVIDER_API CALL
Jeremías Garcia (MD)  Surgery  [address], [address] [place] [address]  Phone: [phone]  Fax: [phone]  Established Patient  Scheduled Appointment: 05/24/2023

## 2023-05-17 ENCOUNTER — NON-APPOINTMENT (OUTPATIENT)
Age: 63
End: 2023-05-17

## 2023-05-17 LAB — SURGICAL PATHOLOGY STUDY: SIGNIFICANT CHANGE UP

## 2023-05-17 RX ORDER — SENNOSIDES 8.6 MG TABLETS 8.6 MG/1
8.6 TABLET ORAL
Qty: 14 | Refills: 0 | Status: ACTIVE | COMMUNITY
Start: 2023-05-17 | End: 1900-01-01

## 2023-05-17 RX ORDER — OXYCODONE 5 MG/1
5 TABLET ORAL
Qty: 12 | Refills: 0 | Status: ACTIVE | COMMUNITY
Start: 2023-05-17 | End: 1900-01-01

## 2023-05-17 RX ORDER — POLYETHYLENE GLYCOL 3350 17 G/17G
17 POWDER, FOR SOLUTION ORAL
Qty: 7 | Refills: 0 | Status: ACTIVE | COMMUNITY
Start: 2023-05-17 | End: 1900-01-01

## 2023-05-22 LAB — SURGICAL PATHOLOGY STUDY: SIGNIFICANT CHANGE UP

## 2023-05-24 ENCOUNTER — APPOINTMENT (OUTPATIENT)
Dept: SURGICAL ONCOLOGY | Facility: CLINIC | Age: 63
End: 2023-05-24
Payer: COMMERCIAL

## 2023-05-24 VITALS
SYSTOLIC BLOOD PRESSURE: 125 MMHG | WEIGHT: 238 LBS | RESPIRATION RATE: 16 BRPM | OXYGEN SATURATION: 96 % | DIASTOLIC BLOOD PRESSURE: 86 MMHG | HEART RATE: 112 BPM | HEIGHT: 74 IN | BODY MASS INDEX: 30.54 KG/M2

## 2023-05-24 DIAGNOSIS — E66.9 OBESITY, UNSPECIFIED: ICD-10-CM

## 2023-05-24 DIAGNOSIS — F32.A DEPRESSION, UNSPECIFIED: ICD-10-CM

## 2023-05-24 DIAGNOSIS — Z87.891 PERSONAL HISTORY OF NICOTINE DEPENDENCE: ICD-10-CM

## 2023-05-24 DIAGNOSIS — C43.62 MALIGNANT MELANOMA OF LEFT UPPER LIMB, INCLUDING SHOULDER: ICD-10-CM

## 2023-05-24 DIAGNOSIS — E78.00 PURE HYPERCHOLESTEROLEMIA, UNSPECIFIED: ICD-10-CM

## 2023-05-24 DIAGNOSIS — M17.9 OSTEOARTHRITIS OF KNEE, UNSPECIFIED: ICD-10-CM

## 2023-05-24 DIAGNOSIS — I10 ESSENTIAL (PRIMARY) HYPERTENSION: ICD-10-CM

## 2023-05-24 DIAGNOSIS — F41.9 ANXIETY DISORDER, UNSPECIFIED: ICD-10-CM

## 2023-05-24 DIAGNOSIS — J45.909 UNSPECIFIED ASTHMA, UNCOMPLICATED: ICD-10-CM

## 2023-05-24 PROCEDURE — 99024 POSTOP FOLLOW-UP VISIT: CPT

## 2023-05-24 NOTE — REASON FOR VISIT
[de-identified] : Wide excision of melanoma left arm  with split-thickness skin graft application [de-identified] : 5/16/23 [de-identified] : 8

## 2023-05-24 NOTE — PHYSICAL EXAM
[Normal] : full range of motion and no deformities appreciated [de-identified] : Left arm melanoma excision site, left thigh donor site no erythema noted

## 2023-05-24 NOTE — ASSESSMENT
[FreeTextEntry1] : Melanoma\par AJCC Stage IIIb \par T2 N2c M0\par \par 63 y/o gentleman who is S?P resection of residual melanoma left arm . Margins are widely negative. Given the satellite nodule he is stage IIIB and a candidate for immunotherapy and will continue follow up with Dr. Evert Barbosa.\par  at [facility]..

## 2023-05-24 NOTE — REVIEW OF SYSTEMS
[Negative] : Psychiatric [de-identified] : left arm melanoma excision, left thigh skin graft donor site

## 2023-05-24 NOTE — HISTORY OF PRESENT ILLNESS
[FreeTextEntry1] : Mr. Tipton presents to the office for first post op visit. He underwent left arm wide excision of melanoma  with split-thickness skin graft application on 5/16/23. Post op he complained do having pain but reports pain is better now and is more tolerable. He also reported the PICCO dressing device was blinking red light but no alarm and dressing is intact. \par \par Pathology: left arm melanoma residual focus of in Situ melanoma, separate residual nodule of melanoma (satellite nodule) involving the deep dermis, microscopically measuring 7.0mm  Associated chronic inflammation, foreign body giant cell reaction, organizing fat necrosis, granulation tissue and significant reactive changes consistent with prior surgical site. No involvement of the inked margins of surgical excision.This information was shared with the patient today.\par \par His dressing was changed in office today. He noted to have oozing at site, pressure dressing applied.\par  [Post-Op Complications] : No post-op complications reported

## 2023-05-24 NOTE — CONSULT LETTER
[Dear  ___] : Dear  [unfilled], [Courtesy Letter:] : I had the pleasure of seeing your patient, [unfilled], in my office today. [Consult Closing:] : Thank you very much for allowing me to participate in the care of this patient.  If you have any questions, please do not hesitate to contact me. [Sincerely,] : Sincerely, [FreeTextEntry1] : He underwent a wide excision of a locally metastatic melanoma of the left forearm on May 16th, 2023. His skin graft is still healing but he is ready to start adjuvant therapy.  [FreeTextEntry3] : Jeremías Garcia Jr., MD\par Director of Surgical Oncology \par [facility]

## 2023-05-31 ENCOUNTER — APPOINTMENT (OUTPATIENT)
Dept: SURGICAL ONCOLOGY | Facility: CLINIC | Age: 63
End: 2023-05-31
Payer: COMMERCIAL

## 2023-05-31 PROCEDURE — 99024 POSTOP FOLLOW-UP VISIT: CPT

## 2023-05-31 NOTE — REASON FOR VISIT
[de-identified] : left arm wide excision of melanoma with split-thickness skin graft application [de-identified] : 5/16/23 [de-identified] : 15

## 2023-05-31 NOTE — PHYSICAL EXAM
[Normal] : oriented to person, place and time, with appropriate affect [de-identified] : Skin graft is 85% healed but he still has oozing from unhealed periphery. Donor site is healed

## 2023-05-31 NOTE — HISTORY OF PRESENT ILLNESS
[FreeTextEntry1] : Mr. Tipton presents to the office for second post op visit. He underwent left arm wide excision of melanoma with split-thickness skin graft application on 5/16/23. Pathology results  discussed with patient during last visit. During last visit he noted to have oozing on the edge of the incision. Dressing changed today in office, graft and donor sites healing well. \par \par \par

## 2023-05-31 NOTE — ASSESSMENT
[FreeTextEntry1] : Melanoma\par AJCC Stage IIIb \par T2 N2c M0\par \par 61 y/o gentleman who is S?P resection of residual melanoma left arm . Margins are widely negative. Given the satellite nodule he is stage IIIB and a candidate for immunotherapy and will continue follow up with Dr. Evert Barbosa.\par at [facility].\par \par 5/31/23\par Second postop visit\par \par The left arm skin graft is about 85% healed.  He still has some oozing around the periphery.  All sutures were removed today.  He was instructed as to the care and management of the skin graft and will continue with daily dressing changes.  The donor site is well-healed and requires no further care\par \par Plan: I will see him in 2 weeks

## 2023-06-12 ENCOUNTER — RX RENEWAL (OUTPATIENT)
Age: 63
End: 2023-06-12

## 2023-06-21 ENCOUNTER — APPOINTMENT (OUTPATIENT)
Dept: SURGICAL ONCOLOGY | Facility: CLINIC | Age: 63
End: 2023-06-21
Payer: COMMERCIAL

## 2023-06-21 VITALS
HEART RATE: 93 BPM | RESPIRATION RATE: 16 BRPM | WEIGHT: 238 LBS | SYSTOLIC BLOOD PRESSURE: 145 MMHG | OXYGEN SATURATION: 96 % | DIASTOLIC BLOOD PRESSURE: 85 MMHG | BODY MASS INDEX: 30.54 KG/M2 | HEIGHT: 74 IN

## 2023-06-21 PROCEDURE — 17250 CHEM CAUT OF GRANLTJ TISSUE: CPT

## 2023-06-21 NOTE — HISTORY OF PRESENT ILLNESS
[FreeTextEntry1] : Mr. Tipton presents to the office for second post op visit. He underwent left arm wide excision of melanoma with split-thickness skin graft application on 5/16/23. Pathology results discussed with patient during last visit. During last visit he noted to have oozing on the edge of the incision. Dressing changed today in office, graft and donor sites healing well. \par \par He presents to the office for post op visit. He has been complaint with dressing changes. He denies having any pain. He followed up with his oncologist Dr. Barbosa and plan is to start immunotherapy \par  [Post-Op Complications] : No post-op complications reported

## 2023-06-21 NOTE — REASON FOR VISIT
[de-identified] :  left arm wide excision of melanoma with split-thickness skin graft application \par  [de-identified] : 5/16/23 [de-identified] : 5

## 2023-06-21 NOTE — PHYSICAL EXAM
[Normal] : oriented to person, place and time, with appropriate affect [de-identified] : left arm graft healing well, hyperpigmentation noted on lateral part of the graft.

## 2023-06-21 NOTE — ASSESSMENT
[FreeTextEntry1] : Melanoma\par AJCC Stage IIIb \par T2 N2c M0\par \par 63 y/o gentleman who is S?P resection of residual melanoma left arm . Margins are widely negative. Given the satellite nodule he is stage IIIB and a candidate for immunotherapy and will continue follow up with Dr. Evert Barbosa.\par at [facility], [place], operated by [facility].\par \par 5/31/23\par Second postop visit\par \par The left arm skin graft is about 85% healed.  He still has some oozing around the periphery.  All sutures were removed today.  He was instructed as to the care and management of the skin graft and will continue with daily dressing changes.  The donor site is well-healed and requires no further care\par \par Plan: I will see him in 2 weeks\par \par 6/21/23\par \par The graft has healed with about 25% granulation tissue alone the periphery. Silver nitrate applied.\par We reviewed the path again and agreed on the benefits of adjuvant immunotherapy based on the poor prognostic e aspects of his cancer.\par \par I'll see him in one month

## 2023-06-21 NOTE — REVIEW OF SYSTEMS
[Negative] : Psychiatric [de-identified] : left arm graft healing well, hyperpigmentation noted on lateral part of the graft.

## 2023-07-26 ENCOUNTER — APPOINTMENT (OUTPATIENT)
Dept: SURGICAL ONCOLOGY | Facility: CLINIC | Age: 63
End: 2023-07-26
Payer: COMMERCIAL

## 2023-07-26 VITALS
BODY MASS INDEX: 30.54 KG/M2 | OXYGEN SATURATION: 95 % | SYSTOLIC BLOOD PRESSURE: 132 MMHG | WEIGHT: 238 LBS | HEIGHT: 74 IN | RESPIRATION RATE: 16 BRPM | HEART RATE: 103 BPM | DIASTOLIC BLOOD PRESSURE: 85 MMHG

## 2023-07-26 DIAGNOSIS — C43.62 MALIGNANT MELANOMA OF LEFT UPPER LIMB, INCLUDING SHOULDER: ICD-10-CM

## 2023-07-26 PROCEDURE — 99212 OFFICE O/P EST SF 10 MIN: CPT | Mod: 25

## 2023-07-26 NOTE — REASON FOR VISIT
[de-identified] :  left arm wide excision of melanoma with split-thickness skin graft application \par  [de-identified] : 5/16/2023 [de-identified] : 10

## 2023-07-26 NOTE — PHYSICAL EXAM
[Normal] : grossly intact [de-identified] : left forearm melanoma excision with graft healed well, dryness noted at the edges advised to apply moisturizer with SPF

## 2023-07-26 NOTE — HISTORY OF PRESENT ILLNESS
[FreeTextEntry1] : Mr. Tipton presents to the office for post op visit. He underwent left arm wide excision of melanoma with split-thickness skin graft application on 5/16/23. . During last visit he noted to have oozing on the edge of the incision. Dressing changed today in office, graft and donor sites healing well. \par He presents to the office for post op visit. He has been complaint with dressing changes. He denies having any pain. He followed up with his oncologist Dr. Barbosa and has started immunotherapy

## 2023-07-26 NOTE — ASSESSMENT
[FreeTextEntry1] : Melanoma\par AJCC Stage IIIb \par T2 N2c M0\par \par 61 y/o gentleman who is S?P resection of residual melanoma left arm . Margins are widely negative. Given the satellite nodule he is stage IIIB and a candidate for immunotherapy and will continue follow up with Dr. Evert Barbosa.\par at [facility].\par \par 5/31/23\par Second postop visit\par The left arm skin graft is about 85% healed.  He still has some oozing around the periphery.  All sutures were removed today.  He was instructed as to the care and management of the skin graft and will continue with daily dressing changes.  The donor site is well-healed and requires no further care\par \par Plan: I will see him in 2 weeks\par \par 6/21/23\par The graft has healed with about 25% granulation tissue alone the periphery. Silver nitrate applied.\par We reviewed the path again and agreed on the benefits of adjuvant immunotherapy based on the poor prognostic e aspects of his cancer.\par \par Plan: I'll see him in one month\par \par 7/26/23\par His skin graft 100% healed. He has completed two cycles of immunotherapy.\par Plan: He will continue f/u with Dr. Kulkarni.

## 2023-08-19 NOTE — ASU PREOP CHECKLIST - CHLOROHEXIDINE WASH 3
Patient is to follow up with PCP for further evaluation and treatment of fingernail fungal infection    Please clean and apply silvadene to wound  Please take medications as directed.  Supportive care and monitoring, and Follow up with your PCp or here in 2 days for wound check and  Go to ER if it is needed as discussed.   31-Dec-2018

## 2024-11-05 NOTE — H&P PST ADULT - WEIGHT IN LBS
246 Discussed the case with Dr. Bolden sts hold antibiotic for now and admit pt. Pt just returned from ultrasound Pt has auditory wheezing. Pt has breath sound expiratory wheezing equal bilaterally and + rale at base bilateral lugs, bp 144/80, rr 88 rr 24 pox 97% in room air. additional fluid is held pt probably has fluid over load cxr showed left pleural effusion. duobnebs x 2 lasix 40 mg iv are ordered. Dr. Bolden is at bed side eval pt.